# Patient Record
Sex: FEMALE | Race: WHITE
[De-identification: names, ages, dates, MRNs, and addresses within clinical notes are randomized per-mention and may not be internally consistent; named-entity substitution may affect disease eponyms.]

---

## 2018-03-10 ENCOUNTER — HOSPITAL ENCOUNTER (EMERGENCY)
Dept: HOSPITAL 41 - JD.ED | Age: 23
Discharge: HOME | End: 2018-03-10
Payer: COMMERCIAL

## 2018-03-10 DIAGNOSIS — J01.00: ICD-10-CM

## 2018-03-10 DIAGNOSIS — J02.9: Primary | ICD-10-CM

## 2018-03-10 PROCEDURE — 96372 THER/PROPH/DIAG INJ SC/IM: CPT

## 2018-03-10 PROCEDURE — 87430 STREP A AG IA: CPT

## 2018-03-10 PROCEDURE — 99283 EMERGENCY DEPT VISIT LOW MDM: CPT

## 2018-03-10 PROCEDURE — 87081 CULTURE SCREEN ONLY: CPT

## 2018-03-10 NOTE — EDM.PDOC
ED HPI GENERAL MEDICAL PROBLEM





- General


Chief Complaint: ENT Problem


Stated Complaint: SORE THROAT


Time Seen by Provider: 03/10/18 02:42


Source of Information: Reports: Patient


History Limitations: Reports: No Limitations





- History of Present Illness


INITIAL COMMENTS - FREE TEXT/NARRATIVE: 





This is a 22-year-old  female. On Wednesday she developed congestion 

which has progressed and now she has lots of drainage out of her nose and down 

her throat and her tonsils are swollen and sore and she comes to the ER for 

evaluation. She has not noted any fever she's had no cough he's had no nausea 

or vomiting. She does not have a history of strep throat though her family 

does. She has had no wheezing and no upset stomach.


  ** Throat


Pain Score (Numeric/FACES): 6





- Related Data


 Allergies











Allergy/AdvReac Type Severity Reaction Status Date / Time


 


No Known Allergies Allergy   Verified 03/10/18 03:06











Home Meds: 


 Home Meds





Non-Formulary Medication [NF Drug] 1 dose PO DAILY 03/10/18 [History]











Past Medical History





- Past Health History


Medical/Surgical History: Denies Medical/Surgical History





Social & Family History





- Family History


Family Medical History: Noncontributory





- Tobacco Use


Smoking Status *Q: Never Smoker





- Recreational Drug Use


Recreational Drug Use: No





ED ROS ENT





- Review of Systems


Review Of Systems: See Below


Constitutional: Denies: Fever, Chills


HEENT: Reports: Rhinitis, Sinus Problem, Throat Swelling


Respiratory: Denies: Shortness of Breath, Wheezing, Cough


Cardiovascular: Reports: No Symptoms


Endocrine: Reports: No Symptoms


GI/Abdominal: Reports: No Symptoms


: Reports: No Symptoms


Musculoskeletal: Reports: No Symptoms


Skin: Reports: No Symptoms


Neurological: Reports: No Symptoms


Psychiatric: Reports: No Symptoms


Hematologic/Lymphatic: Reports: No Symptoms





ED EXAM, ENT





- Physical Exam


Exam: See Below


Exam Limited By: No Limitations


General Appearance: Alert, WD/WN, No Apparent Distress


Eye Exam: Bilateral Eye: Normal Inspection


Ears: Normal External Exam, Normal Canal, Normal TMs


Nose: Nasal Discharge


Mouth/Throat: Normal Lips, Normal Teeth, Tonsillar Erythema, Tonsillar 

Swelling.  No: Tonsillar Exudates


Head: Normocephalic


Neck: Supple


Respiratory/Chest: No Respiratory Distress, Lungs Clear, Normal Breath Sounds


Cardiovascular: Regular Rate, Rhythm, No Murmur


GI/Abdominal: Soft


Back: Full Range of Motion


Extremities: Normal Inspection, Normal Range of Motion


Neurological: Alert, Oriented


Psychiatric: Normal Affect, Normal Mood


Skin: Warm, Dry





Course





- Vital Signs


Last Recorded V/S: 


 Last Vital Signs











Temp  97.3 F   03/10/18 02:11


 


Pulse  101 H  03/10/18 02:11


 


Resp  16   03/10/18 02:11


 


BP  138/85   03/10/18 02:11


 


Pulse Ox  98   03/10/18 02:11














- Orders/Labs/Meds


Orders: 


 Active Orders 24 hr











 Category Date Time Status


 


 CULTURE STREP A CONFIRMATION [] Stat Lab  03/10/18 02:10 Results


 


 STREP SCRN A RAPID W CULT CONF [] Stat Lab  03/10/18 02:10 Results


 


 cefTRIAXone [Rocephin] 1 gm Med  03/10/18 03:30 Active





 Lidocaine 1% [Xylocaine 1%] 2.1 ml   





 IM Q24H   








 Medication Orders





Ceftriaxone Sodium 1 gm/ (Lidocaine HCl 2.1 ml)  0 gm IM Q24H LORI








Meds: 


Medications











Generic Name Dose Route Start Last Admin





  Trade Name Scott  PRN Reason Stop Dose Admin


 


Ceftriaxone Sodium 1 gm/  0 gm  03/10/18 03:30  





  Lidocaine HCl 2.1 ml  IM   





  Q24H LORI   














- Re-Assessments/Exams


Free Text/Narrative Re-Assessment/Exam: 





03/10/18 03:23


I spoke to the patient regarding the negative strep. She still has a marked 

sinus infection and tonsillar swelling so I'll vital with some antibiotics. I 

did  her not to have caffeine or sugar since that will tend to suppress 

her immune system. She is to drink lots of water and natural juices.  She needs 

to rest and sleep as much as possible to help fight this infection.





Departure





- Departure


Time of Disposition: 03:24


Disposition: Home, Self-Care 01


Condition: Fair


Clinical Impression: 


Acute sinusitis


Qualifiers:


 Sinusitis location: maxillary Recurrence: non-recurrent Qualified Code(s): 

J01.00 - Acute maxillary sinusitis, unspecified





Acute pharyngitis


Qualifiers:


 Pharyngitis/tonsillitis etiology: unspecified etiology Qualified Code(s): 

J02.9 - Acute pharyngitis, unspecified





Acute tonsillitis


Qualifiers:


 Pharyngitis/tonsillitis etiology: unspecified etiology Qualified Code(s): 

J03.90 - Acute tonsillitis, unspecified








- Discharge Information


Instructions:  Sinusitis, Adult, Easy-to-Read


Referrals: 


Dorys Reeder, NP [Primary Care Provider] - 


Forms:  ED Department Discharge


Additional Instructions: 


Get your prescription filled tomorrow and start taking medication tomorrow 

evening, drink lots of fluids water and natural juices, avoid sugar and caffeine

, sleep and rest as much as possible so your body can fight the infection, 

follow-up with your family doctor later this week for recheck or return to the 

ER if your symptoms worsen, take Tylenol or ibuprofen as needed for your fever 

if you develop one





- My Orders


Last 24 Hours: 


My Active Orders





03/10/18 02:10


CULTURE STREP A CONFIRMATION [RM] Stat 


STREP SCRN A RAPID W CULT CONF [RM] Stat 





03/10/18 03:30


cefTRIAXone [Rocephin] 1 gm Lidocaine 1% [Xylocaine 1%] 2.1 ml IM Q24H 














- Assessment/Plan


Last 24 Hours: 


My Active Orders





03/10/18 02:10


CULTURE STREP A CONFIRMATION [RM] Stat 


STREP SCRN A RAPID W CULT CONF [] Stat 





03/10/18 03:30


cefTRIAXone [Rocephin] 1 gm Lidocaine 1% [Xylocaine 1%] 2.1 ml IM Q24H